# Patient Record
Sex: FEMALE | Race: WHITE
[De-identification: names, ages, dates, MRNs, and addresses within clinical notes are randomized per-mention and may not be internally consistent; named-entity substitution may affect disease eponyms.]

---

## 2017-03-26 ENCOUNTER — HOSPITAL ENCOUNTER (EMERGENCY)
Dept: HOSPITAL 63 - ER | Age: 27
Discharge: HOME | End: 2017-03-26
Payer: COMMERCIAL

## 2017-03-26 VITALS — SYSTOLIC BLOOD PRESSURE: 126 MMHG | DIASTOLIC BLOOD PRESSURE: 72 MMHG

## 2017-03-26 DIAGNOSIS — F12.10: ICD-10-CM

## 2017-03-26 DIAGNOSIS — Z88.8: ICD-10-CM

## 2017-03-26 DIAGNOSIS — F17.210: ICD-10-CM

## 2017-03-26 DIAGNOSIS — Z90.81: ICD-10-CM

## 2017-03-26 DIAGNOSIS — R10.84: Primary | ICD-10-CM

## 2017-03-26 DIAGNOSIS — Z88.5: ICD-10-CM

## 2017-03-26 DIAGNOSIS — Z90.721: ICD-10-CM

## 2017-03-26 LAB
ALBUMIN SERPL-MCNC: 3.8 G/DL (ref 3.4–5)
ALBUMIN/GLOB SERPL: 1 {RATIO} (ref 1–1.7)
ALP SERPL-CCNC: 47 U/L (ref 46–116)
ALT SERPL-CCNC: 15 U/L (ref 14–59)
ANION GAP SERPL CALC-SCNC: 8 MMOL/L (ref 6–14)
APTT PPP: YELLOW S
AST SERPL-CCNC: 13 U/L (ref 15–37)
BASOPHILS # BLD AUTO: 0.1 X10^3/UL (ref 0–0.2)
BASOPHILS NFR BLD: 1 % (ref 0–3)
BILIRUB SERPL-MCNC: 0.5 MG/DL (ref 0.2–1)
BILIRUB UR QL STRIP: (no result)
BUN/CREAT SERPL: 9 (ref 6–20)
CA-I SERPL ISE-MCNC: 7 MG/DL (ref 7–20)
CALCIUM SERPL-MCNC: 9 MG/DL (ref 8.5–10.1)
CHLORIDE SERPL-SCNC: 104 MMOL/L (ref 98–107)
CO2 SERPL-SCNC: 28 MMOL/L (ref 21–32)
CREAT SERPL-MCNC: 0.8 MG/DL (ref 0.6–1)
EOSINOPHIL NFR BLD: 0 % (ref 0–3)
EOSINOPHIL NFR BLD: 0 X10^3/UL (ref 0–0.7)
ERYTHROCYTE [DISTWIDTH] IN BLOOD BY AUTOMATED COUNT: 13.2 % (ref 11.5–14.5)
FIBRINOGEN PPP-MCNC: CLEAR MG/DL
GFR SERPLBLD BASED ON 1.73 SQ M-ARVRAT: 87.4 ML/MIN
GLOBULIN SER-MCNC: 3.8 G/DL (ref 2.2–3.8)
GLUCOSE SERPL-MCNC: 83 MG/DL (ref 70–99)
GLUCOSE UR STRIP-MCNC: (no result) MG/DL
HCT VFR BLD CALC: 38.4 % (ref 36–47)
HGB BLD-MCNC: 12.4 G/DL (ref 12–15.5)
LIPASE: 79 U/L (ref 73–393)
LYMPHOCYTES # BLD: 2.1 X10^3/UL (ref 1–4.8)
LYMPHOCYTES NFR BLD AUTO: 31 % (ref 24–48)
MCH RBC QN AUTO: 30 PG (ref 25–35)
MCHC RBC AUTO-ENTMCNC: 32 G/DL (ref 31–37)
MCV RBC AUTO: 93 FL (ref 79–100)
MONO #: 0.5 X10^3/UL (ref 0–1.1)
MONOCYTES NFR BLD: 7 % (ref 0–9)
NEUT #: 4.1 X10^3UL (ref 1.8–7.7)
NEUTROPHILS NFR BLD AUTO: 60 % (ref 31–73)
NITRITE UR QL STRIP: (no result)
PLATELET # BLD AUTO: 191 X10^3/UL (ref 140–400)
POTASSIUM SERPL-SCNC: 4 MMOL/L (ref 3.5–5.1)
PROT SERPL-MCNC: 7.6 G/DL (ref 6.4–8.2)
RBC # BLD AUTO: 4.15 X10^6/UL (ref 3.5–5.4)
SODIUM SERPL-SCNC: 140 MMOL/L (ref 136–145)
SP GR UR STRIP: 1.02
UROBILINOGEN UR-MCNC: 2 MG/DL
WBC # BLD AUTO: 6.9 X10^3/UL (ref 4–11)

## 2017-03-26 PROCEDURE — 74020: CPT

## 2017-03-26 PROCEDURE — 80053 COMPREHEN METABOLIC PANEL: CPT

## 2017-03-26 PROCEDURE — 36415 COLL VENOUS BLD VENIPUNCTURE: CPT

## 2017-03-26 PROCEDURE — 85027 COMPLETE CBC AUTOMATED: CPT

## 2017-03-26 PROCEDURE — 81025 URINE PREGNANCY TEST: CPT

## 2017-03-26 PROCEDURE — 81003 URINALYSIS AUTO W/O SCOPE: CPT

## 2017-03-26 PROCEDURE — 83690 ASSAY OF LIPASE: CPT

## 2017-03-26 NOTE — RAD
Indication abdominal pain for one month.



Supine and upright films of the abdomen were obtained. No prior plain film

imaging of the abdomen is available.



The lung bases are clear. There is no free air. The abdominal gas pattern is

normal. Occasional calcifications are seen in the left pelvis compatible with

phleboliths.



IMPRESSION: No acute or significant finding seen in the abdomen on plain films

## 2017-03-26 NOTE — PHYS DOC
General


Chief Complaint:  ABDOMINAL PAIN


Stated Complaint:  ABDOM PAINS


Time Seen by MD:  13:34


Source:  patient


Exam Limitations:  no limitations


Problems:  





History of Present Illness


Initial Comments


Pt is 27/F to ED c/o abdominal pain.


Pt states for the past several weeks she's had generalized abdominal pain.  No n

/v/d, has had constipation in past but states she's having normal BMs.  


Pt states she saw her doctor for this recently diagnosed constipated 

recommended milk of magnesia.


Pt was also seen recently at [facility] for similar symptoms, on that day pt 

reports no labs/imaging she was diagnosed pancreatitis.  Pt states she does not 

drink alcohol, no fever/chills/travel/bad food exposure.  Sx described as 

moderate and achy/cramping, no exacerbating/relieving factors.  LMP 3/7.


Timing/Duration:  other


Severity:  moderate


Associated Symptoms:  other


Allergies:  


Coded Allergies:  


     acetaminophen (Verified  Allergy, Severe, Shortness of Air, 5/7/15)


 HIVES/RASH


     oxycodone (Verified  Allergy, Severe, Shortness of Air, 5/7/15)


 HIVES/RASH





Past Medical History


Medical History:  other (ectopic pregnancy)


Surgical History:  other (left salpingoophorectomy)


GYN History:  ectopic pregnancy





Family History


Significant Family History:  no pertinent family hx





Social History


Smoker:  cigarettes


Alcohol:  none


Drugs:  marijuana





Review of Systems


Constitutional:  denies chills, denies fever, denies malaise


Respiratory:  denies cough, denies shortness of breath


Cardiovascular:  denies chest pain, denies palpitations


Gastrointestinal:   see HPI


Genitourinary:  denies dysuria, denies frequency, denies hematuria


Musculoskeletal:  denies back pain, denies joint swelling, denies neck pain


Psychiatric/Neurological:  denies headache, denies numbness, denies paresthesia


Hematologic/Lymphatic:  denies blood clots, denies easy bleeding, denies easy 

bruising





Physical Exam


General Appearance:  WD/WN, no apparent distress


Eyes:  bilateral eye EOMI, bilateral eye PERRL, bilateral eye normal inspection


Ear, Nose, Throat:  hearing grossly normal, normal ENT inspection, normal 

pharynx


Neck:  non-tender, supple


Respiratory:  normal breath sounds, no respiratory distress


Cardiovascular:  normal peripheral pulses, regular rate, rhythm


Gastrointestinal:  soft (ND, BS normal, generalized TTP no r/g/mass)


Rectal:  deferred


Back:  no CVA tenderness, no vertebral tenderness


Extremities:  non-tender, normal inspection


Neurologic/Psychiatric:  CNs II-XII nml as tested, no motor/sensory deficits, 

alert, normal mood/affect, oriented x 3


Skin:  normal color, warm/dry





Orders, Labs, Meds


UA:  neg for infection


Urine pregnancy negative











PATIENT: ARLEEN DEWEY ACCOUNT: [id number] MRN#: [id number]


: 1990 LOCATION: ER AGE: 27


SEX: F EXAM DT: 17 ACCESSION#: [id number]


STATUS: REG ER ORD. PHYSICIAN: NEEMA LONG DO 


REASON: abdominal pain


PROCEDURE: ABDOMEN SUPINE & UPRIGHT











Indication abdominal pain for one month.





Supine and upright films of the abdomen were obtained. No prior plain film


imaging of the abdomen is available.





The lung bases are clear. There is no free air. The abdominal gas pattern is


normal. Occasional calcifications are seen in the left pelvis compatible with


phleboliths.





IMPRESSION: No acute or significant finding seen in the abdomen on plain films














DICTATED AND SIGNED BY:     SAPNA TELLEZ MD


DATE:     17 1425





CC: PCP,NO; NEEMA LONG DO ~





Reassuring ED workup, pt feeling better after results back.


Departure


Time of Disposition:  14:47


Disposition:  01 HOME, SELF-CARE


Diagnosis:  abdominal pain NOS


Condition:  GOOD


Patient Instructions:  Abdominal Pain (Nonspecific)





Additional Instructions:


Diet/activity as tolerated.


Rx:  dicyclomine, simethicone


Follow up with your doctor in 7-10 days for recheck and specialty referrals if 

indicated.


Return to ED with new or changing symptoms.








NEEMA LONG DO Mar 26, 2017 13:47

## 2017-04-30 ENCOUNTER — HOSPITAL ENCOUNTER (EMERGENCY)
Dept: HOSPITAL 63 - ER | Age: 27
Discharge: HOME | End: 2017-04-30
Payer: COMMERCIAL

## 2017-04-30 VITALS — HEIGHT: 64 IN | WEIGHT: 167 LBS | BODY MASS INDEX: 28.51 KG/M2

## 2017-04-30 VITALS — SYSTOLIC BLOOD PRESSURE: 130 MMHG | DIASTOLIC BLOOD PRESSURE: 82 MMHG

## 2017-04-30 DIAGNOSIS — N76.0: Primary | ICD-10-CM

## 2017-04-30 DIAGNOSIS — E11.9: ICD-10-CM

## 2017-04-30 DIAGNOSIS — F17.210: ICD-10-CM

## 2017-04-30 DIAGNOSIS — F12.10: ICD-10-CM

## 2017-04-30 DIAGNOSIS — Z88.6: ICD-10-CM

## 2017-04-30 DIAGNOSIS — N39.0: ICD-10-CM

## 2017-04-30 LAB
APTT PPP: YELLOW S
BACTERIA #/AREA URNS HPF: (no result) /HPF
BILIRUB UR QL STRIP: (no result)
FIBRINOGEN PPP-MCNC: CLEAR MG/DL
GLUCOSE UR STRIP-MCNC: (no result) MG/DL
NITRITE UR QL STRIP: (no result)
RBC #/AREA URNS HPF: 0 /HPF (ref 0–2)
SP GR UR STRIP: 1.02
SQUAMOUS #/AREA URNS LPF: (no result) /LPF
UROBILINOGEN UR-MCNC: 0.2 MG/DL

## 2017-04-30 PROCEDURE — 87591 N.GONORRHOEAE DNA AMP PROB: CPT

## 2017-04-30 PROCEDURE — 96372 THER/PROPH/DIAG INJ SC/IM: CPT

## 2017-04-30 PROCEDURE — 99284 EMERGENCY DEPT VISIT MOD MDM: CPT

## 2017-04-30 PROCEDURE — 87491 CHLMYD TRACH DNA AMP PROBE: CPT

## 2017-04-30 PROCEDURE — 36415 COLL VENOUS BLD VENIPUNCTURE: CPT

## 2017-04-30 PROCEDURE — 81001 URINALYSIS AUTO W/SCOPE: CPT

## 2017-04-30 NOTE — ED.ADGEN
Past History


Past Medical History:  Depression, Diabetes


Past Surgical History:  No Surgical History


Smoking:  Cigarettes


Alcohol Use:  None


Drug Use:  Marijuana





Adult General


HPI


HPI





Patient is a 27-year-old woman, with a history of chlamydia and gonorrhea a 

year ago, bacterial vaginosis last month, and of urinary tract infections in 

the past, who presents the emergency department with a complaint of dysuria for 

the past 4 days, and concern for possible STI exposure. Patient states that she 

recently found out that her partner was not being monogamous, and she recently 

acquired a new partner. She states she is being monogamous currently, but does 

have concerns about possible exposure. She denies any abdominal pain, any 

nausea or vomiting, any fevers or chills, any flank pain or back pain. Patient 

states that she has burning with urination, and frequency and urgency. 

Occasionally noted yellow discharge in the vagina. She's been taking Azo at 

home for the past 3 days. Denies any other medications, or medical problems, 

any travel, surgery, or other complaints.





Review of Systems


Review of Systems





Constitutional: Denies fever or chills []


Eyes: Denies change in visual acuity, redness, or eye pain []


HENT: Denies nasal congestion or sore throat []


Respiratory: Denies cough or shortness of breath []


Cardiovascular: No additional information not addressed in HPI []


GI: Denies abdominal pain, nausea, vomiting, bloody stools or diarrhea []


: Nice hematuria, complaining of dysuria.


Musculoskeletal: Denies back pain or joint pain []


Integument: Denies rash or skin lesions []


Neurologic: Denies headache, focal weakness or sensory changes []


Endocrine: Denies polyuria or polydipsia []





Current Medications


Current Medications








 Current Medications








 Medications


  (Trade)  Dose


 Ordered  Sig/Alison  Start Time


 Stop Time Status Last Admin


Dose Admin


 


 Azithromycin


  (Zithromax)  1,000 mg  1X  ONCE  4/30/17 10:00


 4/30/17 10:01 DC 4/30/17 10:00


1,000 MG


 


 Ceftriaxone Sodium


  (Rocephin Im)  250 mg  1X  ONCE  4/30/17 10:00


 4/30/17 10:01 DC 4/30/17 10:00


250 MG


 


 Cephalexin HCl


  (Keflex)  500 mg  1X  ONCE  4/30/17 11:00


 4/30/17 11:01   


 


 


 Metronidazole


  (Flagyl)  500 mg  1X  ONCE  4/30/17 11:00


 4/30/17 11:01   


 


 


 Naproxen


  (Naprosyn)  500 mg  1X  ONCE  4/30/17 11:00


 4/30/17 11:01   


 














Allergies


Allergies





 Allergies








Coded Allergies Type Severity Reaction Last Updated Verified


 


  acetaminophen Allergy Severe Shortness of Air 5/7/15 Yes


 


  oxycodone Allergy Severe Shortness of Air 5/7/15 Yes











Physical Exam


Physical Exam





Constitutional: Well developed, well nourished, no acute distress, non-toxic 

appearance. []


HENT: Normocephalic, atraumatic, bilateral external ears normal, oropharynx 

moist, no oral exudates, nose normal. []


Eyes: PERRLA, EOMI, conjunctiva normal, no discharge. [] 


Neck: Normal range of motion, no tenderness, supple, no stridor. [] 


Cardiovascular:Heart rate regular rhythm, no murmur, S1, S2, no rubs or 

gallops. []


Lungs & Thorax:  Bilateral breath sounds clear to auscultation, no wheezing, 

rhonchi, rales. No chest or crepitus or tenderness. []


Abdomen: Bowel sounds normal, soft, no tenderness, no rebound, rigidity, no 

guarding no masses, no pulsatile masses. [] 


Skin: Warm, dry, no erythema, no rash. [] 


Back: No tenderness, no CVA tenderness. [] 


Extremities: No tenderness, no cyanosis, no clubbing, ROM intact, no edema. [] 


Neurologic: Alert and oriented X 3, normal motor function, normal sensory 

function, no focal deficits noted. []


Psychologic: Affect normal, judgement normal, mood normal. []





Pelvic examination: External examination is unremarkable, no lesions or 

injuries identified, bimanual examination reveals a closed os, no CMT, no 

adnexal masses or tenderness identified, small amount of white discharge noted 

in vaginal vault. Speculum examination reveals a normal-appearing cervix, 

specimens taken without issue.





Current Patient Data


Lab Results








 Laboratory Tests








Test


  4/30/17


09:20


 


Urine Collection Type Unknown  


 


Urine Color Yellow  


 


Urine Clarity Clear  


 


Urine pH 6.0  


 


Urine Specific Gravity 1.020  


 


Urine Protein


  Neg


(NEG-TRACE)


 


Urine Glucose (UA)


  Negmg/dL (NEG)


 


 


Urine Ketones (Stick)


  Negmg/dL (NEG)


 


 


Urine Blood Neg (NEG)  


 


Urine Nitrite Neg (NEG)  


 


Urine Bilirubin Neg (NEG)  


 


Urine Urobilinogen Dipstick


  0.2mg/dL (0.2


mg/dL)


 


Urine Leukocyte Esterase Trace (NEG)  


 


Urine RBC 0/HPF (0-2)  


 


Urine WBC


  11-20/HPF


(0-4)


 


Urine Squamous Epithelial


Cells None/LPF  


 


 


Urine Bacteria


  Few/HPF


(0-FEW)


 


Urine Mucus Mod/LPF  














Microbiology


4/30/17 Wet Prep - Final, Complete





EKG


EKG


Not indicated. []





Radiology/Procedures


Radiology/Procedures


Not indicated. []





Course & Med Decision Making


Course & Med Decision Making


Pertinent Labs and Imaging studies reviewed. (See chart for details)





Patient's history and examination concerning for urinary tract infection, also 

concerning for possible STI exposure. After discussion patient requests that 

she be treated prophylactically, and specimens taken and sent for culture. 

Examination performed, patient with moderate amount of white discharge, wet 

prep was positive for bacterial vaginosis. Urinalysis revealed 11-20 WBCs, 

after discussion with patient, will treat for both UTI and bacterial vaginosis 

as well, along with Pyridium. Patient is given first dose all medications in 

the ED without issue. Instructed to follow-up with her primary care provider or 

with a healthcare Center for a full complement of STI testing, importance of 

this testing was discussed with patient in detail, also importance of safe 

sexual practices. Patient voiced understanding and agreement with these 

instructions, and also with medication instructions and return precautions. 

Patient discharged home in stable condition with plan as above.





Final Impression


Final Impression


[]


Problems:  


(1) Bacterial vaginosis


(2) Urinary tract infection


Qualifiers:  


   Qualified Code:  N30.00 - Acute cystitis without hematuria





Dragon Disclaimer


Dragon Disclaimer


This electronic medical record was generated, in whole or in part, using a 

voice recognition dictation system.





Departure


Disposition:  01 HOME, SELF-CARE


Condition:  IMPROVED








BRAYDON HOBBS DO Apr 30, 2017 09:34

## 2017-05-25 ENCOUNTER — HOSPITAL ENCOUNTER (EMERGENCY)
Dept: HOSPITAL 63 - ER | Age: 27
Discharge: HOME | End: 2017-05-25
Payer: COMMERCIAL

## 2017-05-25 VITALS — DIASTOLIC BLOOD PRESSURE: 63 MMHG | SYSTOLIC BLOOD PRESSURE: 98 MMHG

## 2017-05-25 VITALS — HEIGHT: 64 IN | BODY MASS INDEX: 28.57 KG/M2 | WEIGHT: 167.33 LBS

## 2017-05-25 DIAGNOSIS — F17.210: ICD-10-CM

## 2017-05-25 DIAGNOSIS — J45.909: Primary | ICD-10-CM

## 2017-05-25 DIAGNOSIS — Z88.6: ICD-10-CM

## 2017-05-25 PROCEDURE — 99283 EMERGENCY DEPT VISIT LOW MDM: CPT

## 2017-05-25 PROCEDURE — 96372 THER/PROPH/DIAG INJ SC/IM: CPT

## 2017-05-25 NOTE — PHYS DOC
General


Chief Complaint:  SORE THROAT


Stated Complaint:  COUGH,SORE THROAT


Time Seen by MD:  13:24


Source:  patient


Exam Limitations:  no limitations


Problems:  





History of Present Illness


Initial Comments


Pt is 27/F to ED c/o sore throat


Pt states she's had 2 days ST, + strep exposure "baby daddy."  No measured temps

, no dysphagia/bocanegra/dyspnea.  No neck stiffness/rash/n/v/sob/cp.  No prearrival 

treatment, worse with swallowing better with rest.  Requesting shot


Timing/Duration:  yesterday


Severity:  moderate


Location:  throat


Prearrival Treatment:  over the counter meds


Modifying Factors:  worse with coughing, improves with rest


Associated Symptoms:  malaise, poor fluid intake, poor solids intake, sore 

throat


Allergies:  


Coded Allergies:  


     acetaminophen (Verified  Allergy, Severe, Shortness of Air, 5/7/15)


 HIVES/RASH


     oxycodone (Verified  Allergy, Severe, Shortness of Air, 5/7/15)


 HIVES/RASH





Past Medical History


Medical History:  asthma


Surgical History:  noncontributory, other





Family History


Significant Family History:  no pertinent family hx





Social History


Smoker:  cigarettes


Alcohol:  rarely


Drugs:  none





Constitutional:  denies chills, denies diaphoresis, denies fever, malaise


Ears:  denies dizziness, denies pain, denies tinnitus


Nose:  denies clots, denies congestion, denies epistaxis


Throat:  see HPI


Respiratory:  denies cough, denies shortness of breath


Cardiovascular:  denies chest pain, denies palpitations


Gastrointestinal:  denies nausea, denies vomiting





Physical Exam


General Appearance:  WD/WN, no apparent distress


Eyes:  bilateral eye normal inspection, bilateral eye PERRL, bilateral eye EOMI


Nose:  normal inspection


Mouth/Throat:  other (pharynx beefy red with exudate, airway patent)


Neck:  supple, trachea midline, lymphadenopathy (R), lymphadenopathy (L)


Cardiovascular/Respiratory:  normal breath sounds, no respiratory distress


Neurologic/Psychiatric:  CNs II-XII nml as tested, no motor/sensory deficits, 

alert, normal mood/affect, oriented x 3


Skin:  normal color, warm/dry





Orders, Labs, Meds


Will tx empirically PCN based on sx and exposure.


Departure


Time of Disposition:  13:25


Disposition:  01 HOME, SELF-CARE


Diagnosis:  pharyngitis


Condition:  GOOD


Patient Instructions:  Viral and Bacterial Pharyngitis, Easy-to-Read





Additional Instructions:  


Rest, no strenuous activity.


Off work thru 5/27.


OTC tylenol, ibuprofen, and analgesic throat sprays as needed.


Aggressive hydration with gatorade, water.


Follow up with a doctor in one week if not better.


Return to ED with new or changing symptoms.











NEEMA LONG DO May 25, 2017 13:26

## 2017-08-20 ENCOUNTER — HOSPITAL ENCOUNTER (EMERGENCY)
Dept: HOSPITAL 63 - ER | Age: 27
Discharge: HOME | End: 2017-08-20
Payer: COMMERCIAL

## 2017-08-20 VITALS — SYSTOLIC BLOOD PRESSURE: 108 MMHG | DIASTOLIC BLOOD PRESSURE: 70 MMHG

## 2017-08-20 DIAGNOSIS — Z88.5: ICD-10-CM

## 2017-08-20 DIAGNOSIS — Z88.6: ICD-10-CM

## 2017-08-20 DIAGNOSIS — J06.9: Primary | ICD-10-CM

## 2017-08-20 DIAGNOSIS — J45.909: ICD-10-CM

## 2017-08-20 PROCEDURE — 99281 EMR DPT VST MAYX REQ PHY/QHP: CPT

## 2017-08-20 NOTE — PHYS DOC
Past History


Past Medical History:  Asthma


Past Surgical History:  Other


Smoking:  Cigarettes


Alcohol Use:  Rarely


Drug Use:  None





Adult General


Chief Complaint


Chief Complaint:  upper respiratory symptoms





HPI


HPI





Patient is a 27 -year-old female who presents with symptoms that began 

yesterday morning including runny nose, postnasal drainage, scratchy throat, 

head congestion, head and ears feel full, cough. She has felt hot. She doesn't 

feel well. She has 4 kids and she doesn't want them to get it. She had strep 

throat a while back and we gave her a shot and she was better in 24 hours, she 

is hoping for a similar result. Patient does have a history of asthma and has 

been using her inhaler.





Review of Systems


Review of Systems





Constitutional: She has felt hot


HENT: As in history of present illness. She does not have a sore throat like 

she did with strep throat, it feels scratchy.


Respiratory: She has had a cough but is not short of air





Allergies


Allergies





Allergies








Coded Allergies Type Severity Reaction Last Updated Verified


 


  acetaminophen Allergy Severe Shortness of Air 5/7/15 Yes


 


  oxycodone Allergy Severe Shortness of Air 5/7/15 Yes











Physical Exam


Physical Exam





Constitutional: Well developed, well nourished, no acute distress, non-toxic 

appearance. Alert, mentating normally, afebrile, vital signs stable.


HENT: Normocephalic, atraumatic, bilateral external ears normal, oropharynx 

moist, no oral exudates, tonsils not enlarged, without erythema or exudates.


Eyes: PERRLA, EOMI, conjunctiva normal, no discharge.  


Neck: Normal range of motion,  no stridor.  


Lungs & Thorax:  Bilateral breath sounds clear to auscultation wheezes and with 

good air movement bilaterally.


Skin: Warm, dry, no erythema, no rash.  


Extremities: No tenderness, no cyanosis, no clubbing, ROM intact, no edema.  


Neurologic: Alert and oriented X 3, normal motor function, normal sensory 

function, no focal deficits noted.





EKG


EKG


[]





Radiology/Procedures


Radiology/Procedures


[]





Course & Med Decision Making


Course & Med Decision Making


Pertinent Labs and Imaging studies reviewed. (See chart for details)





27-year-old female presents with symptoms consistent with viral URI. Patient 

was educated on the difference between viral and bacterial infection. Spent 

some time discussing with the patient symptomatic relief and good handwashing 

and prevention of spread of viral URI.





[]





Dragon Disclaimer


Dragon Disclaimer


This chart was dictated in whole or in part using Voice Recognition software in 

a busy, high-work load, and often noisy Emergency Department environment.  It 

may contain unintended and wholly unrecognized errors or omissions.





Departure


Departure:


Impression:  


 Primary Impression:  


 Upper respiratory infection


Disposition:  01 HOME, SELF-CARE


Condition:  STABLE


Referrals:  


PCP,NO (PCP)


Patient Instructions:  Upper Respiratory Infection, Adult, Easy-to-Read





Additional Instructions:  


As we discussed, your symptoms are caused by a virus, and antibiotics will not 

make them go away any faster.


Cold/upper respiratory viral infections are very contagious. Good handwashing 

was soap and water. Use hand .





For the symptoms of feeling feverish, hot and cold, also pain such as sore or 

scratchy throat and headache, use over-the-counter Tylenol (acetaminophen) or 

ibuprofen, also called Motrin or Advil.





For the symptoms of feeling congested, head feeling heavy, not able to breathe 

through your nose, use an over-the-counter decongestant such as phenylephrine, 

brand-name Sudafed.





For "runny symptoms" such as runny nose, postnasal drainage, use over-the-

counter decongestant. Benadryl,(diphenhydramine) one every 6 hours works well 

but may be too sedating. If it is, try chlorpheniramine (often sold as allergy 

medicine) during the day, which may be less sedating, and take Benadryl at 

bedtime.





You may buy multisymptom cold medicine that contains and antihistamine, a 

decongestant, and acetaminophen or ibuprofen. Or You may buy each of the above 

and just use the ones for the symptoms that you're having.





Purchase Zinc lozenges, brand name Cold-Eez, and use one every 4-6 hours. These 

will not make your symptoms go away right away, but starting early after the 

onset of a cold they may shorten the duration of a cold and make the symptoms 

less severe.











MAE JIMENEZ MD Aug 20, 2017 11:56

## 2017-09-09 ENCOUNTER — HOSPITAL ENCOUNTER (EMERGENCY)
Dept: HOSPITAL 63 - ER | Age: 27
Discharge: HOME | End: 2017-09-09
Payer: COMMERCIAL

## 2017-09-09 VITALS — DIASTOLIC BLOOD PRESSURE: 63 MMHG | SYSTOLIC BLOOD PRESSURE: 122 MMHG

## 2017-09-09 VITALS — WEIGHT: 164 LBS | HEIGHT: 64 IN | BODY MASS INDEX: 28 KG/M2

## 2017-09-09 DIAGNOSIS — J45.909: ICD-10-CM

## 2017-09-09 DIAGNOSIS — Z88.5: ICD-10-CM

## 2017-09-09 DIAGNOSIS — N39.0: ICD-10-CM

## 2017-09-09 DIAGNOSIS — N72: Primary | ICD-10-CM

## 2017-09-09 DIAGNOSIS — Z88.6: ICD-10-CM

## 2017-09-09 DIAGNOSIS — F17.210: ICD-10-CM

## 2017-09-09 LAB
ALBUMIN SERPL-MCNC: 3.5 G/DL (ref 3.4–5)
ALBUMIN/GLOB SERPL: 1.1 {RATIO} (ref 1–1.7)
ALP SERPL-CCNC: 42 U/L (ref 46–116)
ALT SERPL-CCNC: 18 U/L (ref 14–59)
AMPHETAMINE/METHAMPHETAMINE: (no result)
ANION GAP SERPL CALC-SCNC: 7 MMOL/L (ref 6–14)
APTT PPP: YELLOW S
AST SERPL-CCNC: 13 U/L (ref 15–37)
BACTERIA #/AREA URNS HPF: (no result) /HPF
BARBITURATES UR-MCNC: (no result) UG/ML
BASOPHILS # BLD AUTO: 0.1 X10^3/UL (ref 0–0.2)
BASOPHILS NFR BLD: 2 % (ref 0–3)
BENZODIAZ UR-MCNC: (no result) UG/L
BILIRUB SERPL-MCNC: 0.2 MG/DL (ref 0.2–1)
BILIRUB UR QL STRIP: (no result)
BUN/CREAT SERPL: 16 (ref 6–20)
CA-I SERPL ISE-MCNC: 13 MG/DL (ref 7–20)
CALCIUM SERPL-MCNC: 8.8 MG/DL (ref 8.5–10.1)
CANNABINOIDS UR-MCNC: (no result) UG/L
CHLORIDE SERPL-SCNC: 108 MMOL/L (ref 98–107)
CO2 SERPL-SCNC: 26 MMOL/L (ref 21–32)
COCAINE UR-MCNC: (no result) NG/ML
CREAT SERPL-MCNC: 0.8 MG/DL (ref 0.6–1)
EOSINOPHIL NFR BLD: 0.1 X10^3/UL (ref 0–0.7)
EOSINOPHIL NFR BLD: 2 % (ref 0–3)
ERYTHROCYTE [DISTWIDTH] IN BLOOD BY AUTOMATED COUNT: 12.8 % (ref 11.5–14.5)
FIBRINOGEN PPP-MCNC: (no result) MG/DL
GFR SERPLBLD BASED ON 1.73 SQ M-ARVRAT: 87.4 ML/MIN
GLOBULIN SER-MCNC: 3.1 G/DL (ref 2.2–3.8)
GLUCOSE SERPL-MCNC: 90 MG/DL (ref 70–99)
GLUCOSE UR STRIP-MCNC: (no result) MG/DL
HCT VFR BLD CALC: 34.6 % (ref 36–47)
HGB BLD-MCNC: 11.6 G/DL (ref 12–15.5)
LYMPHOCYTES # BLD: 2.6 X10^3/UL (ref 1–4.8)
LYMPHOCYTES NFR BLD AUTO: 41 % (ref 24–48)
MCH RBC QN AUTO: 31 PG (ref 25–35)
MCHC RBC AUTO-ENTMCNC: 34 G/DL (ref 31–37)
MCV RBC AUTO: 91 FL (ref 79–100)
METHADONE SERPL-MCNC: (no result) NG/ML
MONO #: 0.5 X10^3/UL (ref 0–1.1)
MONOCYTES NFR BLD: 8 % (ref 0–9)
NEUT #: 3 X10^3UL (ref 1.8–7.7)
NEUTROPHILS NFR BLD AUTO: 48 % (ref 31–73)
NITRITE UR QL STRIP: (no result)
OPIATES UR-MCNC: (no result) NG/ML
PCP SERPL-MCNC: (no result) MG/DL
PLATELET # BLD AUTO: 206 X10^3/UL (ref 140–400)
POTASSIUM SERPL-SCNC: 4.6 MMOL/L (ref 3.5–5.1)
PROT SERPL-MCNC: 6.6 G/DL (ref 6.4–8.2)
RBC # BLD AUTO: 3.8 X10^6/UL (ref 3.5–5.4)
RBC #/AREA URNS HPF: (no result) /HPF (ref 0–2)
SODIUM SERPL-SCNC: 141 MMOL/L (ref 136–145)
SP GR UR STRIP: 1.02
SQUAMOUS #/AREA URNS LPF: (no result) /LPF
UROBILINOGEN UR-MCNC: 1 MG/DL
WBC # BLD AUTO: 6.3 X10^3/UL (ref 4–11)
WBC #/AREA URNS HPF: (no result) /HPF (ref 0–4)

## 2017-09-09 PROCEDURE — 96372 THER/PROPH/DIAG INJ SC/IM: CPT

## 2017-09-09 PROCEDURE — 80307 DRUG TEST PRSMV CHEM ANLYZR: CPT

## 2017-09-09 PROCEDURE — 81001 URINALYSIS AUTO W/SCOPE: CPT

## 2017-09-09 PROCEDURE — 80053 COMPREHEN METABOLIC PANEL: CPT

## 2017-09-09 PROCEDURE — 87086 URINE CULTURE/COLONY COUNT: CPT

## 2017-09-09 PROCEDURE — 86701 HIV-1ANTIBODY: CPT

## 2017-09-09 PROCEDURE — 86593 SYPHILIS TEST NON-TREP QUANT: CPT

## 2017-09-09 PROCEDURE — 80074 ACUTE HEPATITIS PANEL: CPT

## 2017-09-09 PROCEDURE — 36415 COLL VENOUS BLD VENIPUNCTURE: CPT

## 2017-09-09 PROCEDURE — 85730 THROMBOPLASTIN TIME PARTIAL: CPT

## 2017-09-09 PROCEDURE — 85610 PROTHROMBIN TIME: CPT

## 2017-09-09 PROCEDURE — 86702 HIV-2 ANTIBODY: CPT

## 2017-09-09 PROCEDURE — 87535 HIV-1 PROBE&REVERSE TRNSCRPJ: CPT

## 2017-09-09 PROCEDURE — 99284 EMERGENCY DEPT VISIT MOD MDM: CPT

## 2017-09-09 PROCEDURE — 85025 COMPLETE CBC W/AUTO DIFF WBC: CPT

## 2017-09-09 PROCEDURE — 86703 HIV-1/HIV-2 1 RESULT ANTBDY: CPT

## 2017-09-09 PROCEDURE — 86592 SYPHILIS TEST NON-TREP QUAL: CPT

## 2017-09-09 PROCEDURE — G0479 DRUG TEST PRESUMP NOT OPT: HCPCS

## 2017-09-09 NOTE — ED.ADGEN
Past History


Past Medical History:  Asthma, Other


Past Surgical History:  No Surgical History


Smoking:  Cigarettes


Alcohol Use:  None


Drug Use:  None





Adult General


Chief Complaint


Chief Complaint


".. I got this discharge.. it gotten worse.. maybe it yeast...I did have 

gonorrhea or chlamydia sent about 6 months ago but I got it treated.. The 

health dept. can only see me on Tues..."





HPI


HPI





Patient is a 27 year old female who presents with above hx and complaints 

increased vaginal discharge. Patient gives a history of 10 lifetime sex 

partners. No previous STDs except one started 6 months ago.  Has been with same 

partner for the last 4 years.  He also got treated for the chlamydia and 

gonorrhea with the last infection.  Follow-up checks were negative for 

reinfection.  Patient denies any history of immunosuppression. Denies IV drug 

use. Does smoke back or marijuana.





Review of Systems


Review of Systems





Constitutional: Denies fever or chills []


Eyes: Denies change in visual acuity, redness, or eye pain []


HENT: Denies nasal congestion or sore throat []


Respiratory: Denies cough or shortness of breath []


Cardiovascular: No additional information not addressed in HPI []


GI: Denies abdominal pain, nausea, vomiting, bloody stools or diarrhea []


: Denies dysuria or hematuria []Complaints of  vaginal discharge. 


Musculoskeletal: Denies back pain or joint pain []


Integument: Denies rash or skin lesions []


Neurologic: Denies headache, focal weakness or sensory changes []


Endocrine: Denies polyuria or polydipsia []





Family History


Family History


Diabetes





Current Medications


Current Medications





Current Medications








 Medications


  (Trade)  Dose


 Ordered  Sig/Alison  Start Time


 Stop Time Status Last Admin


Dose Admin


 


 Azithromycin


  (Zithromax)  1,000 mg  1X  ONCE  9/9/17 22:30


 9/9/17 22:31 DC 9/9/17 23:20


1,000 MG


 


 Ceftriaxone Sodium


  (Rocephin Im)  1 gm  1X  ONCE  9/9/17 22:30


 9/9/17 22:31 DC 9/9/17 23:20


1 GM


 


 Metronidazole


  (Flagyl)  2,000 mg  1X  ONCE  9/9/17 22:30


 9/9/17 22:31 DC 9/9/17 23:20


2,000 MG


 


 Ondansetron HCl


  (Zofran Odt)  8 mg  1X  ONCE  9/9/17 22:30


 9/9/17 22:31 DC 9/9/17 23:20


8 MG











Allergies


Allergies





Allergies








Coded Allergies Type Severity Reaction Last Updated Verified


 


  acetaminophen Allergy Severe Shortness of Air 5/7/15 Yes


 


  oxycodone Allergy Severe Shortness of Air 5/7/15 Yes











Physical Exam


Physical Exam





Constitutional: Well developed, well nourished, no acute distress, non-toxic 

appearance. []


HENT: Normocephalic, atraumatic, bilateral external ears normal, oropharynx 

moist, no oral exudates, nose normal. []


Eyes: PERRLA, EOMI, conjunctiva normal, no discharge. [] 


Neck: Normal range of motion, no tenderness, supple, no stridor. [] 


Cardiovascular:Heart rate regular rhythm, no murmur []


Lungs & Thorax:  Bilateral breath sounds clear to auscultation []


Abdomen: Bowel sounds normal, soft, no tenderness, no masses, no pulsatile 

masses. Except except some mild suprapubic tenderness. Very mild cervical 

motion tenderness.  No adnexal tenderness.  Does have a malodorous creamy 

discharge and findings of cervicitis.  Rectal exam heart stool in vault 

nontender


Skin: Warm, dry, no erythema, no rash. Tattoos.  Niqua  tattoo on right calf


Back: No tenderness, no CVA tenderness. [] 


Extremities: No tenderness, no cyanosis, no clubbing, ROM intact, no edema. [] 


Neurologic: Alert and oriented X 3, normal motor function, normal sensory 

function, no focal deficits noted. []


Psychologic: Affect normal, judgement normal, mood normal. []





Current Patient Data


Vital Signs





 Vital Signs








  Date Time  Temp Pulse Resp B/P (MAP) Pulse Ox O2 Delivery O2 Flow Rate FiO2


 


9/9/17 23:35  74 16 122/63 (82) 99 Room Air  


 


9/9/17 21:02 98.2       








Lab Results





 Laboratory Tests








Test


  9/9/17


21:15 9/9/17


21:23 9/9/17


22:04


 


Urine Collection Type Unknown    


 


Urine Color Yellow    


 


Urine Clarity Hazy    


 


Urine pH 6.0    


 


Urine Specific Gravity 1.025    


 


Urine Protein


  Neg


(NEG-TRACE) 


  


 


 


Urine Glucose (UA)


  Neg mg/dL


(NEG) 


  


 


 


Urine Ketones (Stick)


  Trace mg/dL


(NEG) 


  


 


 


Urine Blood Trace (NEG)    


 


Urine Nitrite Neg (NEG)    


 


Urine Bilirubin Neg (NEG)    


 


Urine Urobilinogen Dipstick


  1 mg/dL (0.2


mg/dL) 


  


 


 


Urine Leukocyte Esterase Trace (NEG)    


 


Urine RBC


  3-5 /HPF (0-2)


  


  


 


 


Urine WBC


  5-10 /HPF


(0-4) 


  


 


 


Urine Squamous Epithelial


Cells Many /LPF  


  


  


 


 


Urine Bacteria


  Few /HPF


(0-FEW) 


  


 


 


Urine Mucus Mod /LPF    


 


Urine Opiates Screen Neg (NEG)    


 


Urine Methadone Screen Neg (NEG)    


 


Urine Barbiturates Neg (NEG)    


 


Urine Phencyclidine Screen Neg (NEG)    


 


Urine


Amphetamine/Methamphetamine Neg (NEG)  


  


  


 


 


Urine Benzodiazepines Screen Neg (NEG)    


 


Urine Cocaine Screen Neg (NEG)    


 


Urine Cannabinoids Screen Pos (NEG)    


 


Urine Ethyl Alcohol Neg (NEG)    


 


White Blood Count


  


  6.3 x10^3/uL


(4.0-11.0) 


 


 


Red Blood Count


  


  3.80 x10^6/uL


(3.50-5.40) 


 


 


Hemoglobin


  


  11.6 g/dL


(12.0-15.5)  L 


 


 


Hematocrit


  


  34.6 %


(36.0-47.0)  L 


 


 


Mean Corpuscular Volume


  


  91 fL ()


  


 


 


Mean Corpuscular Hemoglobin  31 pg (25-35)   


 


Mean Corpuscular Hemoglobin


Concent 


  34 g/dL


(31-37) 


 


 


Red Cell Distribution Width


  


  12.8 %


(11.5-14.5) 


 


 


Platelet Count


  


  206 x10^3/uL


(140-400) 


 


 


Neutrophils (%) (Auto)  48 % (31-73)   


 


Lymphocytes (%) (Auto)  41 % (24-48)   


 


Monocytes (%) (Auto)  8 % (0-9)   


 


Eosinophils (%) (Auto)  2 % (0-3)   


 


Basophils (%) (Auto)  2 % (0-3)   


 


Neutrophils # (Auto)


  


  3.0 x10^3uL


(1.8-7.7) 


 


 


Lymphocytes # (Auto)


  


  2.6 x10^3/uL


(1.0-4.8) 


 


 


Monocytes # (Auto)


  


  0.5 x10^3/uL


(0.0-1.1) 


 


 


Eosinophils # (Auto)


  


  0.1 x10^3/uL


(0.0-0.7) 


 


 


Basophils # (Auto)


  


  0.1 x10^3/uL


(0.0-0.2) 


 


 


Prothrombin Time


  


  10.5 SEC


(9.4-11.4) 


 


 


Prothrombin Time INR  1.0 (0.9-1.1)   


 


PTT


  


  22 SEC (23-33)


L 


 


 


Sodium Level


  


  


  141 mmol/L


(136-145)


 


Potassium Level


  


  


  4.6 mmol/L


(3.5-5.1)


 


Chloride Level


  


  


  108 mmol/L


()  H


 


Carbon Dioxide Level


  


  


  26 mmol/L


(21-32)


 


Anion Gap   7 (6-14)  


 


Blood Urea Nitrogen


  


  


  13 mg/dL


(7-20)


 


Creatinine


  


  


  0.8 mg/dL


(0.6-1.0)


 


Estimated GFR


(Cockcroft-Gault) 


  


  87.4  


 


 


BUN/Creatinine Ratio   16 (6-20)  


 


Glucose Level


  


  


  90 mg/dL


(70-99)


 


Calcium Level


  


  


  8.8 mg/dL


(8.5-10.1)


 


Total Bilirubin


  


  


  0.2 mg/dL


(0.2-1.0)


 


Aspartate Amino Transferase


(AST) 


  


  13 U/L (15-37)


L


 


Alanine Aminotransferase (ALT)


  


  


  18 U/L (14-59)


 


 


Alkaline Phosphatase


  


  


  42 U/L


()  L


 


Total Protein


  


  


  6.6 g/dL


(6.4-8.2)


 


Albumin


  


  


  3.5 g/dL


(3.4-5.0)


 


Albumin/Globulin Ratio   1.1 (1.0-1.7)  











Microbiology


9/9/17 Wet Prep - Final, Complete





EKG


EKG


[]





Radiology/Procedures


Radiology/Procedures


[]





Course & Med Decision Making


Course & Med Decision Making


Pertinent Labs and Imaging studies reviewed. (See chart for details)





Take Keflex 500 mg tid, 7 days.  Must follow up cultures taken here.   Follow 

up health Dept. or primary.  Stop smoking.  Safe sex.   Partner must be 

treated. 


[]





Final Impression


Final Impression


1. Vaginal- Discharge


2. Cervicitis


3. UTI


Problems:  





Dragon Disclaimer


Dragon Disclaimer


This electronic medical record was generated, in whole or in part, using a 

voice recognition dictation system.











JT LEAHY MD Sep 9, 2017 21:04

## 2017-10-09 ENCOUNTER — HOSPITAL ENCOUNTER (EMERGENCY)
Dept: HOSPITAL 63 - ER | Age: 27
Discharge: HOME | End: 2017-10-09
Payer: COMMERCIAL

## 2017-10-09 VITALS — BODY MASS INDEX: 28.57 KG/M2 | HEIGHT: 64 IN | WEIGHT: 167.33 LBS

## 2017-10-09 VITALS — SYSTOLIC BLOOD PRESSURE: 117 MMHG | DIASTOLIC BLOOD PRESSURE: 69 MMHG

## 2017-10-09 DIAGNOSIS — Z88.6: ICD-10-CM

## 2017-10-09 DIAGNOSIS — Z88.5: ICD-10-CM

## 2017-10-09 DIAGNOSIS — F17.210: ICD-10-CM

## 2017-10-09 DIAGNOSIS — N93.9: Primary | ICD-10-CM

## 2017-10-09 DIAGNOSIS — J45.909: ICD-10-CM

## 2017-10-09 LAB
APTT PPP: YELLOW S
BILIRUB UR QL STRIP: (no result)
FIBRINOGEN PPP-MCNC: CLEAR MG/DL
GLUCOSE UR STRIP-MCNC: (no result) MG/DL
NITRITE UR QL STRIP: (no result)
SP GR UR STRIP: 1.01
UROBILINOGEN UR-MCNC: 0.2 MG/DL

## 2017-10-09 PROCEDURE — 81003 URINALYSIS AUTO W/O SCOPE: CPT

## 2017-10-09 PROCEDURE — 81025 URINE PREGNANCY TEST: CPT

## 2017-10-09 PROCEDURE — 99283 EMERGENCY DEPT VISIT LOW MDM: CPT

## 2017-10-09 NOTE — PHYS DOC
Past History


Past Medical History:  No Pertinent History, Asthma, Other


Past Surgical History:  Tubal ligation, Other


Smoking:  Cigarettes


Alcohol Use:  None


Drug Use:  Marijuana





Adult General


Chief Complaint


Chief Complaint:  ABDOMINAL PAIN





HPI


HPI





26 yo F with pelvic cramping for about 2 days . She also has had one big blood 

clot that is passed as well. She denies being pregnant and reports being on 

contraception currently. She denies fevers or chills or nausea or vomiting. She 

denies chest pain or shortness of breath. She denies any recent exposure to STDs

, changes in vaginal discharge, or foul-smelling discharge.





Review of systems is negative for fevers chills nausea vomiting headache 

confusion cyanosis lethargy. All other review of systems is negative unless 

otherwise noted in history of present illness.





ED course: 27-year-old female presenting with pelvic cramping with passage of 1 

blood clot./Vaginal bleeding. Patient is afebrile with a normal heart rate. 

Pertinent physical exam findings, the patient's abdomen is soft nontender and 

without rebound tenderness. Negative McBurney's point. Negative Clark sign. 

Initially I had ordered basic blood work, however after talking to the patient 

she does not desire to have her blood tested which I am okay with. Pregnancy 

test negative. Urinalysis shows no sign of infection. Patient declines vaginal 

exam and does not believe this is related to STD. The patient was then 

discharged home in stable condition to follow up with their primary care 

physician over the next 2-3 days. They were to return if their symptoms 

worsened or if they were concerned for any reason. Face-to-face discharge 

instructions and return precautions were given. Patient's questions were 

answered to their satisfaction. Patient is comfortable plan.





Review of Systems


Review of Systems


SEE ABOVE.





Allergies


Allergies





Allergies








Coded Allergies Type Severity Reaction Last Updated Verified


 


  acetaminophen Allergy Severe Shortness of Air 5/7/15 Yes


 


  oxycodone Allergy Severe Shortness of Air 5/7/15 Yes











Physical Exam


Physical Exam


SEE ABOVE





Constitutional: Well developed, well nourished, no acute distress, non-toxic 

appearance. 


HENT: Normocephalic, atraumatic, bilateral external ears normal, oropharynx 

moist, no oral exudates, nose normal. []


Eyes: PERRLA, EOMI, conjunctiva normal, no discharge. [] 


Neck: Normal range of motion, no tenderness, supple, no stridor. [] 


Cardiovascular:Heart rate regular rhythm, no murmur []


Lungs & Thorax:  Bilateral breath sounds clear to auscultation 


Abdomen: Bowel sounds normal, soft, no tenderness, no masses, no pulsatile 

masses. [] 


Skin: Warm, dry, no erythema, no rash.  


Back: No tenderness, no CVA tenderness. [] 


Extremities: No tenderness, no cyanosis, no clubbing, ROM intact, no edema.  


Neurologic: Alert and oriented X 3, normal motor function, normal sensory 

function, no focal deficits noted. []


Psychologic: Affect normal, judgement normal, mood normal.





Current Patient Data


Vital Signs





 Vital Signs








  Date Time  Temp Pulse Resp B/P (MAP) Pulse Ox O2 Delivery O2 Flow Rate FiO2


 


10/9/17 19:04 98.3 78 16  98 Room Air  








Lab Results





 Laboratory Tests








Test


  10/9/17


20:10


 


POC Urine HCG, Qualitative


  hcg negative


(Negative)











EKG


EKG


[]





Radiology/Procedures


Radiology/Procedures


[]





Course & Med Decision Making


Course & Med Decision Making


Pertinent Labs and Imaging studies reviewed. (See chart for details)





[]





Dragon Disclaimer


Dragon Disclaimer


This chart was dictated in whole or in part using Voice Recognition software in 

a busy, high-work load, and often noisy Emergency Department environment.  It 

may contain unintended and wholly unrecognized errors or omissions.





Departure


Departure:


Impression:  


 Primary Impression:  


 Vaginal bleeding


 Additional Impression:  


 Pelvic cramping


Disposition:  01 HOME, SELF-CARE


Condition:  STABLE


Referrals:  


[facility] (PCP)


Patient Instructions:  Abnormal Uterine Bleeding





Additional Instructions:  


Thank you for allowing us to participate in your care today.





Followup with your primary care physician in 3 days if your symptoms do not 

improve.  Call your Primary Doctor tomorrow and inform them of your visit 

today.  If you do not have a primary care provider you can ask for a list of 

our primary care providers. Return to the emergency department you have any new 

or concerning findings.





This should be evaluated by the primary care physician and any necessary 

consulting services for continued management within a few days after discharge. 

Return to emergency room if you have any  new or concerning symptoms including 

but not limited to fever, chills, nausea, vomiting, intractable pain, any new 

rashes, chest pain, shortness of air, uncontrolled bleeding, difficulty 

breathing, and/or vision loss.


Scripts


Ibuprofen (IBUPROFEN) 400 Mg Tablet


1 TAB PO PRN Q8HRS Y for PAIN, #20 TAB


   Prov: PARRIS MCKEON MD         10/9/17





Problem Qualifiers











PARRIS MCKEON MD Oct 9, 2017 20:13

## 2018-01-24 ENCOUNTER — HOSPITAL ENCOUNTER (EMERGENCY)
Dept: HOSPITAL 63 - ER | Age: 28
Discharge: HOME | End: 2018-01-24
Payer: COMMERCIAL

## 2018-01-24 VITALS — HEIGHT: 64 IN | BODY MASS INDEX: 28.57 KG/M2 | WEIGHT: 167.33 LBS

## 2018-01-24 VITALS — DIASTOLIC BLOOD PRESSURE: 61 MMHG | SYSTOLIC BLOOD PRESSURE: 124 MMHG

## 2018-01-24 DIAGNOSIS — J45.909: ICD-10-CM

## 2018-01-24 DIAGNOSIS — Z88.5: ICD-10-CM

## 2018-01-24 DIAGNOSIS — F17.210: ICD-10-CM

## 2018-01-24 DIAGNOSIS — Z88.6: ICD-10-CM

## 2018-01-24 DIAGNOSIS — N89.8: Primary | ICD-10-CM

## 2018-01-24 DIAGNOSIS — Z98.51: ICD-10-CM

## 2018-01-24 DIAGNOSIS — F12.10: ICD-10-CM

## 2018-01-24 PROCEDURE — 99283 EMERGENCY DEPT VISIT LOW MDM: CPT

## 2018-01-24 PROCEDURE — 81025 URINE PREGNANCY TEST: CPT

## 2018-01-24 NOTE — PHYS DOC
Past History


Past Medical History:  No Pertinent History, Asthma, Other


Past Surgical History:  Tubal ligation, Other


Smoking:  Cigarettes


Alcohol Use:  None


Drug Use:  Marijuana





Adult General


Chief Complaint


Chief Complaint:  SEXUALLY TRANSMITTED DISEASE





HPI


HPI





Is a pleasant 27-year-old female who presents to the ER today with vaginal 

discharge. She is been seeing a new partner recently had exposure to an STD and 

called to tell her this. She's had a yellowish discharge with some burning 

sensation in her vagina for the last couple days. She is also had a prior 

history of STDs in the past. She denies any abdominal pain, back pain, fevers, 

chills, rash, joint swelling or other systemic complaints. Patient does not 

believe she is pregnant denies any abuse of her pelvis denies any rectal pain.





Review of Systems


Review of Systems





Constitutional: Denies fever or chills []


Eyes: Denies change in visual acuity, redness, or eye pain []


HENT: Denies nasal congestion or sore throat []


Respiratory: Denies cough or shortness of breath []


Cardiovascular: No additional information not addressed in HPI []


GI: Denies abdominal pain, nausea, vomiting, bloody stools or diarrhea []


: Denies dysuria or hematuria her only complaint is vaginal discharge[]


Musculoskeletal: Denies back pain or joint pain []


Integument: Denies rash or skin lesions []


Neurologic: Denies headache, focal weakness or sensory changes []


Endocrine: Denies polyuria or polydipsia []





All other systems were reviewed and found to be within normal limits, except as 

documented in this note.





Allergies


Allergies





Allergies








Coded Allergies Type Severity Reaction Last Updated Verified


 


  acetaminophen Allergy Severe Shortness of Air 5/7/15 Yes


 


  oxycodone Allergy Severe Shortness of Air 5/7/15 Yes











Physical Exam


Physical Exam


Vital signs recorded on the chart at this time within normal limits


Constitutional: Well developed, well nourished, no acute distress, non-toxic 

appearance. []


Cardiovascular:Heart rate regular rhythm, no murmur []


Lungs & Thorax:  Bilateral breath sounds clear to auscultation []


Abdomen: Bowel sounds normal, soft, no tenderness, no masses, no pulsatile 

masses. 


 exam:[] Chaperone was present at all times patient has a yellowish 

malodorous discharge that looks like purulent discharge from the cervical os. 

She had no CMT no adnexal fullness or adnexal tenderness no lesions external 

vagina was normal in appearance with no evidence of any kind of vesicles or 

injury.


Skin: Warm, dry, no erythema, no rash. [] 


Back:no CVA tenderness. [] 


Neurologic: Alert and oriented X 3, normal motor function, normal sensory 

function, no focal deficits noted. []


Psychologic: Affect normal, judgement normal, mood normal. []





EKG


EKG


[]





Radiology/Procedures


Radiology/Procedures


[]





Course & Med Decision Making


Course & Med Decision Making


Pertinent Labs and Imaging studies reviewed. (See chart for details)





[]She presents with STD exposure with a vaginal discharge as yellow in nature. 

Collected cultures on her treat her empirically for GC by giving her IM 

Rocephin azithromycin and a dose of doxycycline, home with.





Dragon Disclaimer


Dragon Disclaimer


This electronic medical record was generated, in whole or in part, using a 

voice recognition dictation system.





Departure


Departure:


Impression:  


 Primary Impression:  


 Vaginal discharge


Disposition:  01 HOME, SELF-CARE


Condition:  STABLE


Referrals:  


PCP,NO (PCP)


Patient Instructions:  Chlamydia Test, Chlamydia, Female, Gonorrhea Culture, 

Gonorrhea, Females and Males





Additional Instructions:  


discharge:





I've spoken with the patient and/or caregivers. I've explained the patient's 

condition, diagnosis and treatment plan based on information available to me at 

this time. I've answered the patient's and/or caregivers questions and 

addressed any concerns. The patient and/or caregivers have a good understanding 

the patient's diagnosis, condition and treatment plan as can be expected at 

this point. Vital signs have been stabilized. The patient's condition is stable 

for discharge from the emergency department.





The patient will pursue further outpatient evaluation with her primary care 

provider or other designated consulting physician as outlined in the discharge 

instructions. Patient and/or caregivers are agreeable to this plan of care and 

follow-up instructions have been explained in detail. The patient and/or 

caregivers have received these instructions in written format and expressed 

understanding of these discharge instructions. The patient and her caregivers 

are aware that if any significant change in condition or worsening of symptoms 

should prompt him to immediately return to this of the closest emergency 

department.  If an emergent department is not readily available I would 

encourage him to call 911.


Scripts


Doxycycline Monohydrate (DOXYCYCLINE MONOHYDRATE) 100 Mg Capsule


1 CAP PO BID, #[id number] CAP


   Prov: WESLEY ROSENTHAL MD         1/24/18











WESLEY ROSENTHAL MD Jan 24, 2018 08:30

## 2018-08-21 ENCOUNTER — HOSPITAL ENCOUNTER (EMERGENCY)
Dept: HOSPITAL 61 - ER | Age: 28
Discharge: HOME | End: 2018-08-21
Payer: COMMERCIAL

## 2018-08-21 VITALS — WEIGHT: 165 LBS | HEIGHT: 64 IN | BODY MASS INDEX: 28.17 KG/M2

## 2018-08-21 VITALS — SYSTOLIC BLOOD PRESSURE: 98 MMHG | DIASTOLIC BLOOD PRESSURE: 54 MMHG

## 2018-08-21 DIAGNOSIS — F17.210: ICD-10-CM

## 2018-08-21 DIAGNOSIS — O26.899: Primary | ICD-10-CM

## 2018-08-21 DIAGNOSIS — Z88.5: ICD-10-CM

## 2018-08-21 DIAGNOSIS — M54.5: ICD-10-CM

## 2018-08-21 DIAGNOSIS — Z88.6: ICD-10-CM

## 2018-08-21 DIAGNOSIS — O99.330: ICD-10-CM

## 2018-08-21 DIAGNOSIS — Z3A.00: ICD-10-CM

## 2018-08-21 LAB
ANION GAP SERPL CALC-SCNC: 7 MMOL/L (ref 6–14)
BASOPHILS # BLD AUTO: 0.1 X10^3/UL (ref 0–0.2)
BASOPHILS NFR BLD: 1 % (ref 0–3)
BUN SERPL-MCNC: 8 MG/DL (ref 7–20)
CALCIUM SERPL-MCNC: 9 MG/DL (ref 8.5–10.1)
CHLORIDE SERPL-SCNC: 105 MMOL/L (ref 98–107)
CO2 SERPL-SCNC: 27 MMOL/L (ref 21–32)
CREAT SERPL-MCNC: 0.6 MG/DL (ref 0.6–1)
EOSINOPHIL NFR BLD: 0 X10^3/UL (ref 0–0.7)
EOSINOPHIL NFR BLD: 1 % (ref 0–3)
ERYTHROCYTE [DISTWIDTH] IN BLOOD BY AUTOMATED COUNT: 12.1 % (ref 11.5–14.5)
GFR SERPLBLD BASED ON 1.73 SQ M-ARVRAT: 120.8 ML/MIN
GLUCOSE SERPL-MCNC: 89 MG/DL (ref 70–99)
HCT VFR BLD CALC: 36.7 % (ref 36–47)
HGB BLD-MCNC: 12.4 G/DL (ref 12–15.5)
LYMPHOCYTES # BLD: 2 X10^3/UL (ref 1–4.8)
LYMPHOCYTES NFR BLD AUTO: 34 % (ref 24–48)
MCH RBC QN AUTO: 31 PG (ref 25–35)
MCHC RBC AUTO-ENTMCNC: 34 G/DL (ref 31–37)
MCV RBC AUTO: 92 FL (ref 79–100)
MONO #: 0.3 X10^3/UL (ref 0–1.1)
MONOCYTES NFR BLD: 6 % (ref 0–9)
NEUT #: 3.4 X10^3UL (ref 1.8–7.7)
NEUTROPHILS NFR BLD AUTO: 58 % (ref 31–73)
PLATELET # BLD AUTO: 220 X10^3/UL (ref 140–400)
POTASSIUM SERPL-SCNC: 4.3 MMOL/L (ref 3.5–5.1)
RBC # BLD AUTO: 4 X10^6/UL (ref 3.5–5.4)
SODIUM SERPL-SCNC: 139 MMOL/L (ref 136–145)
WBC # BLD AUTO: 5.9 X10^3/UL (ref 4–11)

## 2018-08-21 PROCEDURE — 99284 EMERGENCY DEPT VISIT MOD MDM: CPT

## 2018-08-21 PROCEDURE — 85025 COMPLETE CBC W/AUTO DIFF WBC: CPT

## 2018-08-21 PROCEDURE — 36415 COLL VENOUS BLD VENIPUNCTURE: CPT

## 2018-08-21 PROCEDURE — 84702 CHORIONIC GONADOTROPIN TEST: CPT

## 2018-08-21 PROCEDURE — 81025 URINE PREGNANCY TEST: CPT

## 2018-08-21 PROCEDURE — 80048 BASIC METABOLIC PNL TOTAL CA: CPT

## 2018-08-21 NOTE — PHYS DOC
Past Medical History


Past Medical History:  Cancer, Other


Additional Past Medical Histor:  ECTOPIC PREGNANCY, removal of cervical cancer 

cells


Past Surgical History:  Other


Additional Past Surgical Histo:  LEEP, ECTOPIC SURG, Cervical Cancer biopsy


Additional Information:  


3-4 cigarettes per day


Alcohol Use:  Occasionally


Drug Use:  Marijuana





Adult General


Chief Complaint


Chief Complaint:  FLANK PAIN





HPI


HPI


29 y/o female presents to ER for c/o mid lower back pain which started on 

Friday and has been intermittent. Pt reports she also has taken 2 home 

pregnancy tests which were positive- she is uncertain of LMP. She reports she 

had been on birth control and had been switched to another type. She denies any 

abd pain, vaginal pain/pressure, vaginal bleeding, or pelvic pain/pressure. She 

denies N/V/D. She denies urinary sxs, fever/chills, or swelling in extremities. 

She currently rates pain at 2-3/10 denying any OTC meds at home. 





She reports she has been on vaginal cream for past yr for chronic BV- denies 

any acute change in vaginal discharge denying vaginal odor. 





Pt reports with this pregnancy she is  with youngest child 2 yrs old. 





Pt has bruising to her rt upper lateral thigh and lt knee- she reports she 

drank a significant amount of alcohol over the weekend (prior to taking the 

home preg. test) and had fell onto her knee/leg. Denies head/neck pain. Denies 

any increase in lower back pain after drinking/fall.





Review of Systems


Review of Systems





Constitutional: Denies fever or chills []


Eyes: Denies change in visual acuity, redness, or eye pain []


HENT: Denies nasal congestion or sore throat []


Respiratory: Denies cough or shortness of breath []


Cardiovascular: No additional information not addressed in HPI []


GI: Denies abdominal pain, nausea, vomiting, bloody stools or diarrhea []


: Denies dysuria or hematuria. Denies incontinence. 


Musculoskeletal: Reports mid to lt side lower back pain. Denies swelling in 

extremities. 


Integument: Denies rash or skin lesions []


Neurologic: Denies headache, focal weakness or sensory changes []








All other systems were reviewed and found to be within normal limits, except as 

documented in this note.





Current Medications


Current Medications





Current Medications








 Medications


  (Trade)  Dose


 Ordered  Sig/Valdemar  Start Time


 Stop Time Status Last Admin


Dose Admin


 


 Acetaminophen


  (Tylenol)  650 mg  1X  ONCE  18 13:30


 18 13:31 DC 18 13:26


650 MG


 


 Ibuprofen


  (Motrin)  600 mg  1X  ONCE  18 13:30


 18 13:31 Cancel  














Allergies


Allergies





Allergies








Coded Allergies Type Severity Reaction Last Updated Verified


 


  acetaminophen Adverse Reaction Intermediate diziness 3/5/15 Yes


 


  oxycodone Adverse Reaction Intermediate diziness 3/5/15 Yes











Physical Exam


Physical Exam





Constitutional: Well developed, well nourished, no acute distress, non-toxic 

appearance. []


HENT: Normocephalic, atraumatic, bilateral ears normal, mucous membranes pink/

moist, no oral exudates, nose normal. []


Eyes: PERRLA, conjunctiva normal, no discharge. [] 


Neck: Normal range of motion, no tenderness, supple, no stridor. [] 


Cardiovascular:Heart rate regular rhythm, no murmur []


Lungs & Thorax:  Bilateral breath sounds clear to auscultation. Resp equal/

nonlabored


Abdomen: Bowel sounds normal, soft/nondistended, no tenderness, no masses


Skin: Warm, dry, no erythema, no rash. [] 


Back: Tender to palp. mid lower back into lt lower back- reproducable with palp

, no CVA tenderness. [] 


Extremities: No tenderness, no cyanosis, no clubbing, ROM intact, no edema. [] 


Neurologic: Alert and oriented X 3, normal motor function, normal sensory 

function, no focal deficits noted. []


Psychologic: Affect normal, judgement normal, mood normal. []





Current Patient Data


Vital Signs





 Vital Signs








  Date Time  Temp Pulse Resp B/P (MAP) Pulse Ox O2 Delivery O2 Flow Rate FiO2


 


18 13:27  71 16 98/54 (69) 100 Room Air  


 


18 12:00 98.3       





 98.3       








Lab Values





 Laboratory Tests








Test


 18


12:12 18


13:09


 


POC Urine HCG, Qualitative


 Hcg positive


(Negative) 





 


White Blood Count


 


 5.9 x10^3/uL


(4.0-11.0)


 


Red Blood Count


 


 4.00 x10^6/uL


(3.50-5.40)


 


Hemoglobin


 


 12.4 g/dL


(12.0-15.5)


 


Hematocrit


 


 36.7 %


(36.0-47.0)


 


Mean Corpuscular Volume


 


 92 fL ()





 


Mean Corpuscular Hemoglobin  31 pg (25-35)  


 


Mean Corpuscular Hemoglobin


Concent 


 34 g/dL


(31-37)


 


Red Cell Distribution Width


 


 12.1 %


(11.5-14.5)


 


Platelet Count


 


 220 x10^3/uL


(140-400)


 


Neutrophils (%) (Auto)  58 % (31-73)  


 


Lymphocytes (%) (Auto)  34 % (24-48)  


 


Monocytes (%) (Auto)  6 % (0-9)  


 


Eosinophils (%) (Auto)  1 % (0-3)  


 


Basophils (%) (Auto)  1 % (0-3)  


 


Neutrophils # (Auto)


 


 3.4 x10^3uL


(1.8-7.7)


 


Lymphocytes # (Auto)


 


 2.0 x10^3/uL


(1.0-4.8)


 


Monocytes # (Auto)


 


 0.3 x10^3/uL


(0.0-1.1)


 


Eosinophils # (Auto)


 


 0.0 x10^3/uL


(0.0-0.7)


 


Basophils # (Auto)


 


 0.1 x10^3/uL


(0.0-0.2)


 


Maternal Serum HCG Beta


Subunit 


 1261 mIU/mL


(0-5)  H


 


Sodium Level


 


 139 mmol/L


(136-145)


 


Potassium Level


 


 4.3 mmol/L


(3.5-5.1)


 


Chloride Level


 


 105 mmol/L


()


 


Carbon Dioxide Level


 


 27 mmol/L


(21-32)


 


Anion Gap  7 (6-14)  


 


Blood Urea Nitrogen


 


 8 mg/dL (7-20)





 


Creatinine


 


 0.6 mg/dL


(0.6-1.0)


 


Estimated GFR


(Cockcroft-Gault) 


 120.8  





 


Glucose Level


 


 89 mg/dL


(70-99)


 


Calcium Level


 


 9.0 mg/dL


(8.5-10.1)





 Laboratory Tests


18 13:09








 Laboratory Tests


18 13:09














EKG


EKG


[]





Radiology/Procedures


Radiology/Procedures


[]





Course & Med Decision Making


Course & Med Decision Making


Pertinent Labs (See chart for details)





1408: Discussed test results with pt- labs unremarkable with HCG quant 1261. 

Discussed with confirmed pregnancy she would need to establish OB/GYN for 

further eval/care. With pt having no vaginal bleeding, abd cramping, or pelvic 

pain/pressure no additional tests done. CBC/BMP NL and UA unremarkable. 

Discussed use of tylenol as needed and as directed on container. Discussed 

adequate fld intake/well balanced meal. Will provide community resources to 

assist pt with finding clinic/OB GYN to f/u with now that preg. was confirmed. 

Pt is in no visible distress remaining nontoxic in appearance. With pt having 2 

yr old and reporting she had been carrying her 50# niece over the weekend- 

discuss possible lower back strain. Discharge instructions discussed and 

education provided on s&s to return to ER for. 








RN reported while she was discussing ER discharge plan with pt- pt became began 

cussing and told RN she came to ER for an US and felt she should get one. So 

this provider went in and further discussed pt's case with her explaining with 

no abd pain, N/V, pelvic pain/pressure, vaginal bleeding or discharge tests no 

additional testing needed during this ER visit. Again discussed need for 

establishing OB/GYN for further care/eval. Following discussion pt less anxious

- advised her that with any concerns or sxs discussed she should return to ER. 

Pt after discussion feels comfortable with home discharge and is in no visible 

distress. Advised on avoiding alcohol. Discussed OTC prenatal vitamin daily. 





Pt's case and plan of care was discussed with Dr. León prior to discharge and 

he was agreeable with no US needed at this time.





Dragon Disclaimer


Dragon Disclaimer


This electronic medical record was generated, in whole or in part, using a 

voice recognition dictation system.





Departure


Departure


Impression:  


 Primary Impression:  


 Back pain


 Additional Impression:  


 Pregnancy confirmed by positive blood test


Disposition:  01 HOME, SELF-CARE


Condition:  STABLE


Referrals:  


NO PCP (PCP)


Patient Instructions:  Back Pain, Adult, Pregnancy





Additional Instructions:  


Tylenol for pain as directed on container. 





You need to find an OB/GYN doctor to follow-up with further evaluation and care 

as your pregnancy was confirmed while in the Emergency Department.





Avoid alcohol/smoking.





You beta HCG quantitative (pregnancy level) was 1261 today





Drink plenty of water and eat well balanced meals- you can start taking over 

the counter prenatal vitamins.





Problem Qualifiers











MARGIE MONTALVO Aug 21, 2018 14:17

## 2018-09-16 ENCOUNTER — HOSPITAL ENCOUNTER (EMERGENCY)
Dept: HOSPITAL 63 - ER | Age: 28
Discharge: HOME | End: 2018-09-16
Payer: COMMERCIAL

## 2018-09-16 VITALS — HEIGHT: 64 IN | BODY MASS INDEX: 28.57 KG/M2 | WEIGHT: 167.33 LBS

## 2018-09-16 VITALS — DIASTOLIC BLOOD PRESSURE: 52 MMHG | SYSTOLIC BLOOD PRESSURE: 95 MMHG

## 2018-09-16 DIAGNOSIS — S63.611A: ICD-10-CM

## 2018-09-16 DIAGNOSIS — Z88.5: ICD-10-CM

## 2018-09-16 DIAGNOSIS — Z88.6: ICD-10-CM
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
## 2018-09-16 DIAGNOSIS — O99.331: ICD-10-CM

## 2018-09-16 DIAGNOSIS — Z3A.01: ICD-10-CM
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
## 2018-09-16 DIAGNOSIS — O9A.211: Primary | ICD-10-CM

## 2018-09-16 PROCEDURE — 73130 X-RAY EXAM OF HAND: CPT

## 2018-09-16 PROCEDURE — 99284 EMERGENCY DEPT VISIT MOD MDM: CPT

## 2018-09-16 NOTE — PHYS DOC
Past History


Past Medical History:  No Pertinent History


Past Surgical History:  Other


Smoking:  Cigarettes


Alcohol Use:  None


Drug Use:  None





Adult General


Chief Complaint


Chief Complaint:  FINGER INJURY





HPI


HPI


28 showed female presents with left index finger pain. Patient was just poking 

her sister with her finger when it bent backwards dorsally. Afterward she had 

pain. Today it is very swollen and the pain is worse so the patient was concern 

for fracture. She has fractured a different finger in the past. She denies any 

other injuries. She is approximate 7 weeks pregnant. She has no pregnancy 

related complaints.





Review of Systems


Review of Systems





Constitutional: Denies fever or chills []


Eyes: Denies change in visual acuity, redness, or eye pain []


HENT: Denies nasal congestion or sore throat []


Respiratory: Denies cough or shortness of breath []


Cardiovascular: No additional information not addressed in HPI []


GI: Denies abdominal pain, nausea, vomiting, bloody stools or diarrhea []


: Denies dysuria or hematuria []


Musculoskeletal: Left index finger pain[]


Integument: Denies rash or skin lesions []


Neurologic: Denies headache, focal weakness or sensory changes []


Endocrine: Denies polyuria or polydipsia []





All other systems were reviewed and found to be within normal limits, except as 

documented in this note.





Allergies


Allergies





Allergies








Coded Allergies Type Severity Reaction Last Updated Verified


 


  acetaminophen Allergy Severe Shortness of Air 5/7/15 Yes


 


  oxycodone Allergy Severe Shortness of Air 5/7/15 Yes











Physical Exam


Physical Exam





Constitutional: Well developed, well nourished, no acute distress, non-toxic 

appearance. []


HENT: Normocephalic, atraumatic, bilateral external ears normal, oropharynx 

moist, no oral exudates, nose normal. []


Eyes: PERRLA, EOMI, conjunctiva normal, no discharge. [] 


Neck: Normal range of motion, no tenderness, supple, no stridor. [] 


Cardiovascular:Heart rate regular rhythm, no murmur []


Lungs & Thorax:  Bilateral breath sounds clear to auscultation []


Abdomen: Bowel sounds normal, soft, no tenderness, no masses, no pulsatile 

masses. [] 


Skin: Warm, dry, no erythema, no rash. [] 


Back: No tenderness, no CVA tenderness. [] 


Extremities: Swelling of the left index finger at the MCP. Tenderness with 

palpation.[] 


Neurologic: Alert and oriented X 3, normal motor function, normal sensory 

function, no focal deficits noted. []


Psychologic: Affect normal, judgement normal, mood normal. []





Current Patient Data


Vital Signs





 Vital Signs








  Date Time  Temp Pulse Resp B/P (MAP) Pulse Ox O2 Delivery O2 Flow Rate FiO2


 


9/16/18 15:25 98.1 83 16  97 Room Air  











EKG


EKG


[]





Radiology/Procedures


Radiology/Procedures


[]


Impressions:


Preliminary read: The patient's x-ray is negative for fracture or dislocation.





Course & Med Decision Making


Course & Med Decision Making


Pertinent Labs and Imaging studies reviewed. (See chart for details)


Patient's finger is not fractured and dislocated. She just has a sprain of the 

left index finger. We will octaviano tape it for comfort she is stable for 

discharge at this time.


[]





Dragon Disclaimer


Dragon Disclaimer


This electronic medical record was generated, in whole or in part, using a 

voice recognition dictation system.





Departure


Departure:


Referrals:  


PCP,NO (PCP)











YOUNG LAGUNAS DO Sep 16, 2018 16:36

## 2018-09-16 NOTE — RAD
Left hand, 3 views, 9/16/2018:

 

HISTORY: Pain, injury

 

No fracture or dislocation is identified.

 

IMPRESSION: No significant left hand abnormality is detected.

 

Electronically signed by: Rick Moritz, MD (9/16/2018 5:06 PM) [facility]

## 2018-11-17 ENCOUNTER — HOSPITAL ENCOUNTER (EMERGENCY)
Dept: HOSPITAL 63 - ER | Age: 28
Discharge: HOME | End: 2018-11-17
Payer: COMMERCIAL

## 2018-11-17 VITALS
SYSTOLIC BLOOD PRESSURE: 122 MMHG | DIASTOLIC BLOOD PRESSURE: 63 MMHG | HEIGHT: 64 IN | BODY MASS INDEX: 28 KG/M2 | WEIGHT: 164 LBS

## 2018-11-17 DIAGNOSIS — R10.2: ICD-10-CM

## 2018-11-17 DIAGNOSIS — Z88.5: ICD-10-CM

## 2018-11-17 DIAGNOSIS — O26.892: Primary | ICD-10-CM

## 2018-11-17 DIAGNOSIS — Z3A.16: ICD-10-CM

## 2018-11-17 DIAGNOSIS — M22.42: ICD-10-CM

## 2018-11-17 DIAGNOSIS — Z88.6: ICD-10-CM

## 2018-11-17 DIAGNOSIS — O99.332: ICD-10-CM

## 2018-11-17 PROCEDURE — 99281 EMR DPT VST MAYX REQ PHY/QHP: CPT

## 2018-11-17 NOTE — PHYS DOC
Past History


Past Medical History:  No Pertinent History


Past Surgical History:  Other


Smoking:  Cigarettes


Alcohol Use:  None


Drug Use:  None





Adult General


Chief Complaint


Chief Complaint:  ABDOMINAL PAIN





[facility]


HPI





Patient is a 28-year-old female who presents with complaint of left knee pain 

and lower abdominal/pelvic pain/cramping. Patient states that the pelvic pain 

has been present for the last several weeks and states that she is between 16 

and 17 weeks pregnant. She denies any vaginal discharge or bleeding. She states 

the pain is worsened if she stretches and states that it is fairly mild. She 

denies any urinary discomfort. Patient also complains of her left knee that has 

been chronically painful since at least the age of 17. She states that she was 

told at that time that she had arthritis in her knee. Patient was seen [facility]

[facility] ER today and states that she is frustrated because she didn't feel like her 

complaint was taken seriously, stating that the doctor didn't even look at her 

knee. She indicates that they did check a urine on her and told her that she 

did not have a urinary tract infection.





Review of Systems


Review of Systems





Constitutional: Denies fever or chills []


Respiratory: Denies cough or shortness of breath []


Cardiovascular: No additional information not addressed in HPI []


GI: Complains of lower abdominal/pelvic cramping. Patient does admit to 

periodic nausea but has had no vomiting or diarrhea.[]


: Denies dysuria or hematuria []


Musculoskeletal: Complains of left knee pain []





All other systems were reviewed and found to be within normal limits, except as 

documented in this note.





Allergies


Allergies





Allergies








Coded Allergies Type Severity Reaction Last Updated Verified


 


  acetaminophen Allergy Severe Shortness of Air 5/7/15 Yes


 


  oxycodone Allergy Severe Shortness of Air 5/7/15 Yes











Physical Exam


Physical Exam





Constitutional: Well developed, well nourished, no acute distress, non-toxic 

appearance. []


Neck: Normal range of motion, no tenderness, supple, no stridor. [] 


Cardiovascular:Heart rate regular rhythm, no murmur []


Lungs & Thorax:  Bilateral breath sounds clear to auscultation []


Abdomen: Bowel sounds normal, soft, no tenderness. [] 


Skin: Warm, dry, no erythema, no rash. [] 


Extremities: Ligamentous exam for both knees is unremarkable. Olson's test of 

left knee is positive suggestive of patellofemoral disorder. []





EKG


EKG


[]





Radiology/Procedures


Radiology/Procedures


[]





Course & Med Decision Making


Course & Med Decision Making


Pertinent Labs and Imaging studies reviewed. (See chart for details)





[]





Dragon Disclaimer


Dragon Disclaimer


This electronic medical record was generated, in whole or in part, using a 

voice recognition dictation system.





Departure


Departure:


Impression:  


 Primary Impression:  


 Round ligament pain


 Additional Impression:  


 Patella, chondromalacia


Disposition:  01 HOME, SELF-CARE


Condition:  STABLE


Referrals:  


PCP,NO (PCP)


Patient Instructions:  Chondroitin; Glucosamine tablets or capsules, 

Chondromalacia, Form - Excuse from Work, School, or Physical Activity





Problem Qualifiers








 Additional Impression:  


 Patella, chondromalacia


 Laterality:  left  Qualified Codes:  M22.42 - Chondromalacia patellae, left 

knee








ANDREW BOUCHER Jr. DO Nov 17, 2018 15:06

## 2018-12-09 ENCOUNTER — HOSPITAL ENCOUNTER (EMERGENCY)
Dept: HOSPITAL 63 - ER | Age: 28
Discharge: OTHER INSTITUTION | End: 2018-12-09
Payer: COMMERCIAL

## 2018-12-09 VITALS — SYSTOLIC BLOOD PRESSURE: 100 MMHG | DIASTOLIC BLOOD PRESSURE: 62 MMHG

## 2018-12-09 VITALS — WEIGHT: 174.17 LBS | BODY MASS INDEX: 29.73 KG/M2 | HEIGHT: 64 IN

## 2018-12-09 DIAGNOSIS — O99.332: ICD-10-CM

## 2018-12-09 DIAGNOSIS — Z71.6: ICD-10-CM

## 2018-12-09 DIAGNOSIS — Z3A.19: ICD-10-CM

## 2018-12-09 DIAGNOSIS — O99.511: Primary | ICD-10-CM

## 2018-12-09 DIAGNOSIS — J06.9: ICD-10-CM

## 2018-12-09 DIAGNOSIS — Z88.5: ICD-10-CM

## 2018-12-09 PROCEDURE — 87070 CULTURE OTHR SPECIMN AEROBIC: CPT

## 2018-12-09 PROCEDURE — 87880 STREP A ASSAY W/OPTIC: CPT

## 2018-12-09 PROCEDURE — 99285 EMERGENCY DEPT VISIT HI MDM: CPT

## 2018-12-09 NOTE — PHYS DOC
Past History


Past Medical History:  No Pertinent History


Past Surgical History:  Other


Smoking:  Cigarettes


Alcohol Use:  None


Drug Use:  None





Adult General


Chief Complaint


Chief Complaint:  SORE THROAT





HPI


HPI





Patient is a 28 year old female  at 19 weeks of gestation who presents 

with complaining of sore throat. Patient complaining of sore throat since 

yesterday with nasal congestion and cough without fever and chills. Patient 

states she is not able to swallow because of sore throat. Patient denies 

vaginal bleeding or abdominal pain and contraction. Patient states she did not 

smoke cigarettes for the last 3 days.





Review of Systems


Review of Systems





Constitutional: Denies fever or chills []


Eyes: Denies change in visual acuity, redness, or eye pain []


HENT: Reports nasal congestion or sore throat


Respiratory: Reports cough, denies shortness of breath []


Cardiovascular: No additional information not addressed in HPI []


GI: Denies abdominal pain, nausea, vomiting, bloody stools or diarrhea []


: Denies dysuria or hematuria []


Musculoskeletal: Denies back pain or joint pain []


Integument: Denies rash or skin lesions []


Neurologic: Denies headache, focal weakness or sensory changes []


Endocrine: Denies polyuria or polydipsia []





All other systems were reviewed and found to be within normal limits, except as 

documented in this note.





Current Medications


Current Medications





Current Medications








 Medications


  (Trade)  Dose


 Ordered  Sig/Alison  Start Time


 Stop Time Status Last Admin


Dose Admin


 


 Acetaminophen


  (Tylenol)  1,000 mg  1X  ONCE  18 08:45


 18 08:46 DC 18 08:37


1,000 MG











Allergies


Allergies





Allergies








Coded Allergies Type Severity Reaction Last Updated Verified


 


  oxycodone Allergy Severe CENTRAL NERVOUS SYSTEM 18 Yes











Physical Exam


Physical Exam





Constitutional: Well developed, well nourished, mild distress, non-toxic 

appearance. []


HENT: Normocephalic, atraumatic, bilateral external ears normal, oropharynx 

moist, no tonsillar enlargement enlargement, uvula mild edema and erythema, no 

oral exudates, nasal congestion


Eyes: PERRLA, EOMI, conjunctiva normal, no discharge. [] 


Neck: Normal range of motion, no tenderness, supple, no stridor. [] 


Cardiovascular:Heart rate regular rhythm, no murmur []


Lungs & Thorax:  Bilateral breath sounds clear to auscultation []


Abdomen: Gravid abdomen, no tenderness


Skin: Warm, dry, no erythema, no rash. [] 


Back: No tenderness, no CVA tenderness. [] 


Extremities: No tenderness, no cyanosis, no clubbing, ROM intact, no edema. [] 


Neurologic: Alert and oriented X 3, normal motor function, normal sensory 

function, no focal deficits noted. []


Psychologic: Affect anxious, judgement normal, mood normal. []





Current Patient Data


Vital Signs





 Vital Signs








  Date Time  Temp Pulse Resp B/P (MAP) Pulse Ox O2 Delivery O2 Flow Rate FiO2


 


18 08:24 97.9 80 20  98 Room Air  











EKG


EKG


[]





Radiology/Procedures


Radiology/Procedures


[]





Course & Med Decision Making


Course & Med Decision Making


Evaluation of patient in ER showed 28-year-old female patient at 19 weeks of 

gestation with complaining of upper respiratory symptom. Patient was afebrile 

in ER and treated with Tylenol. Patient had negative stress to this and plan to 

discharge home with diagnosis of URI and prescription of liquid amoxicillin and 

Lortab because patient states she is not able to take pills by mouth because of 

pain.





Dragon Disclaimer


Dragon Disclaimer


This electronic medical record was generated, in whole or in part, using a 

voice recognition dictation system.





Departure


Departure:


Impression:  


 Primary Impression:  


 Upper respiratory infection


 Additional Impressions:  


 Currently pregnant


 Tobacco abuse


 Tobacco abuse counseling


Disposition:   ADMITTED AS INPATIENT (at 0847)


Condition:  IMPROVED


Referrals:  


PCP,NO (PCP)


Patient Instructions:  ABCs of Pregnancy, Smoking Cessation, Tips For Success, 

Upper Respiratory Infection, Adult





Additional Instructions:  


Drink plenty of liquids


Follow-up with your primary care physician in 3-5 days


Return to ER if not getting better


Take over-the-counter Tylenol and Benadryl as needed for pain and cough


Follow-up with your OB/GYN as needed


Scripts


Hydrocodone Bit/Acetaminophen (HYDROCODONE-APAP 5-217/10 SOLN) 10 Ml Solution


5 ML PO PRN Q6HRS for pain, #100 ML 0 Refills


   Prov: HALLEY SEN MD         18 


Amoxicillin (AMOXICILLIN) 250 Mg/5 Ml Susp.recon


10 ML PO Q8HRS for Infection, #210 ML


   Prov: HALLEY SEN MD         12/9/18





Problem Qualifiers











HALLEY SEN MD Dec 9, 2018 08:52